# Patient Record
Sex: FEMALE | Race: WHITE | NOT HISPANIC OR LATINO | Employment: UNEMPLOYED | URBAN - METROPOLITAN AREA
[De-identification: names, ages, dates, MRNs, and addresses within clinical notes are randomized per-mention and may not be internally consistent; named-entity substitution may affect disease eponyms.]

---

## 2017-01-06 ENCOUNTER — HOSPITAL ENCOUNTER (EMERGENCY)
Facility: HOSPITAL | Age: 37
Discharge: HOME | End: 2017-01-06
Payer: MEDICAID

## 2017-01-06 VITALS
BODY MASS INDEX: 47.84 KG/M2 | HEART RATE: 99 BPM | RESPIRATION RATE: 20 BRPM | HEIGHT: 62 IN | SYSTOLIC BLOOD PRESSURE: 133 MMHG | DIASTOLIC BLOOD PRESSURE: 81 MMHG | TEMPERATURE: 98 F | WEIGHT: 260 LBS | OXYGEN SATURATION: 97 %

## 2017-01-06 DIAGNOSIS — R07.81 RIB PAIN ON LEFT SIDE: ICD-10-CM

## 2017-01-06 DIAGNOSIS — S20.212A CHEST WALL CONTUSION, LEFT, INITIAL ENCOUNTER: Primary | ICD-10-CM

## 2017-01-06 PROCEDURE — 99283 EMERGENCY DEPT VISIT LOW MDM: CPT

## 2017-01-06 RX ORDER — CYCLOBENZAPRINE HCL 10 MG
10 TABLET ORAL 3 TIMES DAILY PRN
Qty: 21 TABLET | Refills: 0 | Status: SHIPPED | OUTPATIENT
Start: 2017-01-06 | End: 2017-01-16

## 2017-01-06 NOTE — ED PROVIDER NOTES
SCRIBE #1 NOTE: I, Romana Terrell, am scribing for, and in the presence of, BALTAZAR Ascencio. I have scribed the entire note.      History      Chief Complaint   Patient presents with    Rib Injury     fell yesterday and left ribs hurts       Review of patient's allergies indicates:  No Known Allergies     HPI   HPI    1/6/2017, 12:34 PM   History obtained from the patient      History of Present Illness: Kymberly Talbot is a 36 y.o. female patient who presents to the Emergency Department for rib pain which onset suddenly last night. Pain is located to the left anterior ribs. Pt states that pain onset after falling and hitting her left ribs. Sx have been constant and moderate in severity. Sx exacerbated with deep inhalation and movement. No mitigating factors noted. Prior treatment includes Ibuprofen and Naprosyn with no relief. No associated sx included. Pt denies any fever, chills, CP, SOB, n/v, or LOC. No further complaints at this time.       Arrival mode: Personal vehicle      PCP: MAXWELL Lorenzo       Past Medical History:  Past Medical History   Diagnosis Date    Asthma     Cancer      uterine    Chronic anemia     Encounter for blood transfusion     Migraine     Migraine headache     Pulmonary embolism     Vaginal cuff cellulitis        Past Surgical History:  Past Surgical History   Procedure Laterality Date    Dilation and curettage of uterus      Pr removal of ovary/tube(s) Bilateral 2-20-15     robotic    Hysterectomy  2-20-15     robotic         Family History:  Family History   Problem Relation Age of Onset    Diabetes Mother     Stroke Mother     Hypertension Mother     Mental illness Mother     Hypertension Father     Heart attack Father     Asthma Sister     Lung cancer Maternal Aunt     Diabetes Maternal Aunt     Migraines Maternal Aunt     Breast cancer Neg Hx     Colon cancer Neg Hx     Ovarian cancer Neg Hx        Social History:  Social History     Social  History Main Topics    Smoking status: Never Smoker    Smokeless tobacco: Never Used    Alcohol use No    Drug use: No    Sexual activity: No       ROS   Review of Systems   Constitutional: Negative for chills, diaphoresis and fever.   HENT: Negative for sore throat.    Respiratory: Negative for shortness of breath.    Cardiovascular: Negative for chest pain.   Gastrointestinal: Negative for diarrhea, nausea and vomiting.   Genitourinary: Negative for dysuria.   Musculoskeletal: Negative for back pain.        (+) left rib pain   Skin: Negative for rash.   Neurological: Negative for dizziness, weakness, light-headedness, numbness and headaches.   Hematological: Does not bruise/bleed easily.       Physical Exam    Initial Vitals   BP Pulse Resp Temp SpO2   01/06/17 1229 01/06/17 1229 01/06/17 1229 01/06/17 1229 01/06/17 1229   133/81 99 20 97.9 °F (36.6 °C) 97 %      Physical Exam  Nursing Notes and Vital Signs Reviewed.  Constitutional: Patient is in no acute distress. Awake and alert. Well-developed and well-nourished.  Head: Atraumatic. Normocephalic.  Eyes: PERRL. EOM intact. Conjunctivae are not pale. No scleral icterus.  ENT: Mucous membranes are moist. Oropharynx is clear and symmetric.    Neck: Supple. Full ROM. No lymphadenopathy.  Cardiovascular: Regular rate. Regular rhythm. No murmurs, rubs, or gallops. Distal pulses are 2+ and symmetric.  Pulmonary/Chest: No respiratory distress. Clear to auscultation bilaterally. No wheezing, rales, or rhonchi.  Abdominal: Soft and non-distended.  There is no tenderness.  No rebound, guarding, or rigidity.   Musculoskeletal: Moves all extremities. No obvious deformities. Left anterior rib tenderness. No crepitus. No bruising.   Skin: Warm and dry.  Neurological:  Alert, awake, and appropriate.  Normal speech.  No acute focal neurological deficits are appreciated.  Psychiatric: Normal affect. Good eye contact. Appropriate in content.    ED Course    Procedures  ED  "Vital Signs:  Vitals:    01/06/17 1229   BP: 133/81   Pulse: 99   Resp: 20   Temp: 97.9 °F (36.6 °C)   TempSrc: Oral   SpO2: 97%   Weight: 117.9 kg (260 lb)   Height: 5' 2" (1.575 m)     Imaging Results:  Imaging Results         X-Ray Ribs 2 View Left (Final result) Result time:  01/06/17 13:01:43    Final result by WADE Barnes Sr., MD (01/06/17 13:01:43)    Impression:         Normal study.      Electronically signed by: WADE BARNES MD  Date:     01/06/17  Time:    13:01     Narrative:    Left rib series    Clinical History:     Pleurodynia    Findings: Comparison was made to a chest x-ray performed on 12/30/2016.  There is no rib fracture visualized.  The visualized portion of the lungs is clear.  There is no pneumothorax.  The left costophrenic angle is sharp.                      The Emergency Provider reviewed the vital signs and test results, which are outlined above.    ED Discussion     1:15 PM: Reassessed pt at this time. Discussed with pt all pertinent ED information and results. Discussed pt dx and plan of tx. Gave pt all f/u and return to the ED instructions. All questions and concerns were addressed at this time. Pt expresses understanding of information and instructions, and is comfortable with plan to discharge. Pt is stable for discharge.      New Prescriptions    CYCLOBENZAPRINE (FLEXERIL) 10 MG TABLET    Take 1 tablet (10 mg total) by mouth 3 (three) times daily as needed for Muscle spasms.       Follow-up Information     Follow up with MAXWELL Lorenzo In 1 week(s).    Specialty:  Family Medicine    Contact information:    [address]  [facility] [address] [phone]              Medical Decision Making    Medical Decision Making:   Clinical Tests:   Radiological Study: Ordered and Reviewed           Scribe Attestation:   Scribe #1: I performed the above scribed service and the documentation accurately describes the services I performed. I attest to the " accuracy of the note.    Attending:   Physician Attestation Statement for Scribe #1: I, BALTAZAR Ascencio, personally performed the services described in this documentation, as scribed by Romana Terrell, in my presence, and it is both accurate and complete.          Clinical Impression       ICD-10-CM ICD-9-CM   1. Chest wall contusion, left, initial encounter S20.212A 922.1   2. Rib pain on left side R07.81 786.50       Disposition:   Disposition: Discharged  Condition: Stable         BALTAZAR Moralez  01/07/17 0803

## 2017-01-06 NOTE — DISCHARGE INSTRUCTIONS
Chest Contusion    A contusion is a bruise to the skin, muscle, or ribs. It may cause pain, tenderness, and swelling. It may turn the skin purple until it heals. Contusions take a few days to a few weeks to heal.  Home care  Follow these guidelines when caring for yourself at home:  · Rest. Dont do any heavy lifting or strenuous activity. Dont do any activity that causes pain.  · Put an ice pack on the injured area. Do this for 20 minutes every 1 to 2 hours the first day. You can make an ice pack by wrapping a plastic bag of ice cubes in a thin towel. Continue to use the ice pack 3 to 4 times a day for the next 2 days. Then use the ice pack as needed to ease pain and swelling.  · After 1 to 2 days you may put a warm compress on the area. Do this for 10 minutes several times a day. A warm compress is a clean cloth thats damp with warm water.  · Hold a pillow to the affected area when you cough. This will help ease pain.  · You may use acetaminophen or ibuprofen to control pain, unless another pain medicine was prescribed. If you have chronic liver or kidney disease, talk with your health care provider before using these medicines. Also talk with your provider if youve had a stomach ulcer or GI bleeding.  Follow-up care  Follow up with your health care provider during the next week, or as advised.  When to seek medical advice  Call your health care provider right away if any of these occur:  · Shortness of breath, difficulty breathing, or breathing fast  · Chest pain gets worse when you breathe  · Severe pain that comes on suddenly or lasts more than an hour  · Dizziness, weakness, or fainting  · New abdominal pain or abdominal pain that gets worse  ·  Fever of 101ºF (38.3ºC) or higher, or as directed by your health care provider  © 0111-0639 [employer]. [address] [place] [address], [place] [address]. All rights reserved. This information is not intended as a substitute for professional medical care.  Always follow your healthcare professional's instructions.

## 2019-07-17 ENCOUNTER — HOSPITAL ENCOUNTER (EMERGENCY)
Dept: HOSPITAL 96 - M.ERS | Age: 39
Discharge: HOME | End: 2019-07-17
Payer: COMMERCIAL

## 2019-07-17 VITALS — DIASTOLIC BLOOD PRESSURE: 71 MMHG | SYSTOLIC BLOOD PRESSURE: 109 MMHG

## 2019-07-17 VITALS — BODY MASS INDEX: 47.85 KG/M2 | HEIGHT: 62 IN | WEIGHT: 260.01 LBS

## 2019-07-17 DIAGNOSIS — M25.561: Primary | ICD-10-CM

## 2019-07-17 DIAGNOSIS — Z90.49: ICD-10-CM

## 2019-07-17 DIAGNOSIS — Z90.710: ICD-10-CM

## 2019-07-17 DIAGNOSIS — Z88.6: ICD-10-CM

## 2019-07-29 ENCOUNTER — HOSPITAL ENCOUNTER (EMERGENCY)
Dept: HOSPITAL 96 - M.ERS | Age: 39
Discharge: HOME | End: 2019-07-29
Payer: COMMERCIAL

## 2019-07-29 VITALS — BODY MASS INDEX: 47.85 KG/M2 | WEIGHT: 260.01 LBS | HEIGHT: 62 IN

## 2019-07-29 VITALS — SYSTOLIC BLOOD PRESSURE: 145 MMHG | DIASTOLIC BLOOD PRESSURE: 91 MMHG

## 2019-07-29 DIAGNOSIS — Z90.710: ICD-10-CM

## 2019-07-29 DIAGNOSIS — Z88.8: ICD-10-CM

## 2019-07-29 DIAGNOSIS — J02.9: ICD-10-CM

## 2019-07-29 DIAGNOSIS — J18.1: Primary | ICD-10-CM

## 2019-08-18 ENCOUNTER — HOSPITAL ENCOUNTER (EMERGENCY)
Dept: HOSPITAL 96 - M.ERS | Age: 39
LOS: 1 days | Discharge: HOME | End: 2019-08-19
Payer: COMMERCIAL

## 2019-08-18 VITALS — BODY MASS INDEX: 47.85 KG/M2 | WEIGHT: 260.01 LBS | HEIGHT: 62 IN

## 2019-08-18 DIAGNOSIS — Z90.49: ICD-10-CM

## 2019-08-18 DIAGNOSIS — R07.89: Primary | ICD-10-CM

## 2019-08-18 DIAGNOSIS — Z90.710: ICD-10-CM

## 2019-08-18 DIAGNOSIS — Z88.6: ICD-10-CM

## 2019-08-19 VITALS — SYSTOLIC BLOOD PRESSURE: 145 MMHG | DIASTOLIC BLOOD PRESSURE: 82 MMHG

## 2019-08-19 LAB
ABSOLUTE BASOPHILS: 0 THOU/UL (ref 0–0.2)
ABSOLUTE EOSINOPHILS: 0.2 THOU/UL (ref 0–0.7)
ABSOLUTE MONOCYTES: 0.5 THOU/UL (ref 0–1.2)
ALBUMIN SERPL-MCNC: 3.5 G/DL (ref 3.4–5)
ALP SERPL-CCNC: 114 U/L (ref 46–116)
ALT SERPL-CCNC: 49 U/L (ref 30–65)
ANION GAP SERPL CALC-SCNC: 10 MMOL/L (ref 7–16)
APTT BLD: 26.8 SECONDS (ref 25–31.3)
AST SERPL-CCNC: 25 U/L (ref 15–37)
BASOPHILS NFR BLD AUTO: 0.5 %
BILIRUB SERPL-MCNC: 0.3 MG/DL
BUN SERPL-MCNC: 13 MG/DL (ref 7–18)
CALCIUM SERPL-MCNC: 8.4 MG/DL (ref 8.5–10.1)
CHLORIDE SERPL-SCNC: 105 MMOL/L (ref 98–107)
CK-MB MASS: 1.5 NG/ML
CO2 SERPL-SCNC: 25 MMOL/L (ref 21–32)
CREAT SERPL-MCNC: 0.8 MG/DL (ref 0.6–1.3)
EOSINOPHIL NFR BLD: 2.9 %
GLUCOSE SERPL-MCNC: 106 MG/DL (ref 70–99)
GRANULOCYTES NFR BLD MANUAL: 59.5 %
HCT VFR BLD CALC: 37.3 % (ref 37–47)
HGB BLD-MCNC: 12.5 GM/DL (ref 12–15)
INR PPP: 1
LIPASE: 122 U/L (ref 73–393)
LYMPHOCYTES # BLD: 2.2 THOU/UL (ref 0.8–5.3)
LYMPHOCYTES NFR BLD AUTO: 29.9 %
MAGNESIUM SERPL-MCNC: 1.7 MG/DL (ref 1.8–2.4)
MCH RBC QN AUTO: 28 PG (ref 26–34)
MCHC RBC AUTO-ENTMCNC: 33.5 G/DL (ref 28–37)
MCV RBC: 83.5 FL (ref 80–100)
MONOCYTES NFR BLD: 7.2 %
MPV: 8.1 FL. (ref 7.2–11.1)
NEUTROPHILS # BLD: 4.4 THOU/UL (ref 1.6–8.1)
NT-PRO BRAIN NAT PEPTIDE: 65 PG/ML (ref ?–300)
NUCLEATED RBCS: 0 /100WBC
PLATELET COUNT*: 248 THOU/UL (ref 150–400)
POTASSIUM SERPL-SCNC: 3.6 MMOL/L (ref 3.5–5.1)
PROT SERPL-MCNC: 7.2 G/DL (ref 6.4–8.2)
PROTHROMBIN TIME: 10 SECONDS (ref 9.2–11.5)
RBC # BLD AUTO: 4.46 MIL/UL (ref 4.2–5)
RDW-CV: 13.5 % (ref 10.5–14.5)
SODIUM SERPL-SCNC: 140 MMOL/L (ref 136–145)
TROPONIN-I LEVEL: <0.06 NG/ML (ref ?–0.06)
WBC # BLD AUTO: 7.4 THOU/UL (ref 4–11)

## 2019-08-19 NOTE — EKG
[place] [address]
Phone:  [phone]                     ELECTROCARDIOGRAM REPORT      
_______________________________________________________________________________
 
Name:       MYRABEE TRIP             [id number]      Account #:      [id number]  
Admission:  19     Attend Phys:                         
Discharge:  19     Date of Birth:  [birth year]  
         Report #: [id number]
    [id number]
_______________________________________________________________________________
THIS REPORT FOR:  //name//                      
 
                         [facility] ED
                                       
Test Date:    2019               Test Time:    00:04:21
Pat Name:     BEE RENTERIA            Department:   
Patient ID:   [id number]            Room:          
Gender:       F                        Technician:   FL
:          1980               Requested By: Paco Welch
Order Number: [id number] MD:   Dameon Garcia
                                 Measurements
Intervals                              Axis          
Rate:         82                       P:            59
ME:           160                      QRS:          19
QRSD:         91                       T:            14
QT:           377                                    
QTc:          441                                    
                           Interpretive Statements
Sinus rhythm
No previous ECG available for comparison
 
Electronically Signed On 2019 17:11:45 CDT by Dameon Garcia
[email or web address]
 
 
 
 
 
 
 
 
 
 
 
 
 
 
 
 
 
 
 
 
  <ELECTRONICALLY SIGNED>
                                           By: Dameon Garcia MD, [facility]      
  19     1711
D: 19 0004   _____________________________________
T: 19 0004   Dameon Garcia MD, FACC        /EPI

## 2021-07-31 ENCOUNTER — HISTORICAL (OUTPATIENT)
Dept: ADMINISTRATIVE | Facility: HOSPITAL | Age: 41
End: 2021-07-31

## 2023-04-01 ENCOUNTER — HOSPITAL ENCOUNTER (EMERGENCY)
Facility: HOSPITAL | Age: 43
Discharge: LEFT BEFORE DISCHARGE | End: 2023-04-01
Payer: COMMERCIAL

## 2023-04-01 PROCEDURE — 99281 EMR DPT VST MAYX REQ PHY/QHP: CPT

## 2023-05-01 ENCOUNTER — HOSPITAL ENCOUNTER (EMERGENCY)
Facility: HOSPITAL | Age: 43
Discharge: HOME | End: 2023-05-01
Attending: INTERNAL MEDICINE
Payer: MEDICAID

## 2023-05-01 VITALS
OXYGEN SATURATION: 100 % | BODY MASS INDEX: 45.76 KG/M2 | DIASTOLIC BLOOD PRESSURE: 101 MMHG | SYSTOLIC BLOOD PRESSURE: 144 MMHG | HEART RATE: 100 BPM | TEMPERATURE: 98 F | RESPIRATION RATE: 18 BRPM | WEIGHT: 250.19 LBS

## 2023-05-01 DIAGNOSIS — R07.9 CHEST PAIN, UNSPECIFIED TYPE: Primary | ICD-10-CM

## 2023-05-01 PROBLEM — G43.909 MIGRAINE HEADACHE: Status: ACTIVE | Noted: 2023-05-01

## 2023-05-01 LAB
ALBUMIN SERPL-MCNC: 3.5 G/DL (ref 3.5–5)
ALBUMIN/GLOB SERPL: 1.1 RATIO (ref 1.1–2)
ALP SERPL-CCNC: 100 UNIT/L (ref 40–150)
ALT SERPL-CCNC: 29 UNIT/L (ref 0–55)
AMPHET UR QL SCN: POSITIVE
APPEARANCE UR: CLEAR
AST SERPL-CCNC: 18 UNIT/L (ref 5–34)
BARBITURATE SCN PRESENT UR: NEGATIVE
BASOPHILS # BLD AUTO: 0.03 X10(3)/MCL (ref 0–0.2)
BASOPHILS NFR BLD AUTO: 0.5 %
BENZODIAZ UR QL SCN: NEGATIVE
BILIRUB UR QL STRIP.AUTO: NEGATIVE MG/DL
BILIRUBIN DIRECT+TOT PNL SERPL-MCNC: 0.4 MG/DL
BNP BLD-MCNC: <10 PG/ML
BUN SERPL-MCNC: 9 MG/DL (ref 7–18.7)
CALCIUM SERPL-MCNC: 9.1 MG/DL (ref 8.4–10.2)
CANNABINOIDS UR QL SCN: NEGATIVE
CHLORIDE SERPL-SCNC: 106 MMOL/L (ref 98–107)
CO2 SERPL-SCNC: 25 MMOL/L (ref 22–29)
COCAINE UR QL SCN: NEGATIVE
COLOR UR AUTO: YELLOW
CREAT SERPL-MCNC: 0.78 MG/DL (ref 0.55–1.02)
D DIMER PPP IA.FEU-MCNC: 0.31 UG/ML FEU (ref 0–0.5)
EOSINOPHIL # BLD AUTO: 0.16 X10(3)/MCL (ref 0–0.9)
EOSINOPHIL NFR BLD AUTO: 2.5 %
ERYTHROCYTE [DISTWIDTH] IN BLOOD BY AUTOMATED COUNT: 13.1 % (ref 11.5–17)
EST. AVERAGE GLUCOSE BLD GHB EST-MCNC: 128.4 MG/DL
FENTANYL UR QL SCN: NEGATIVE
GFR SERPLBLD CREATININE-BSD FMLA CKD-EPI: >60 MLS/MIN/1.73/M2
GLOBULIN SER-MCNC: 3.3 GM/DL (ref 2.4–3.5)
GLUCOSE SERPL-MCNC: 219 MG/DL (ref 74–100)
GLUCOSE UR QL STRIP.AUTO: ABNORMAL MG/DL
HBA1C MFR BLD: 6.1 %
HCT VFR BLD AUTO: 38.1 % (ref 37–47)
HGB BLD-MCNC: 12.1 G/DL (ref 12–16)
IMM GRANULOCYTES # BLD AUTO: 0.04 X10(3)/MCL (ref 0–0.04)
IMM GRANULOCYTES NFR BLD AUTO: 0.6 %
KETONES UR QL STRIP.AUTO: NEGATIVE MG/DL
LEUKOCYTE ESTERASE UR QL STRIP.AUTO: NEGATIVE UNIT/L
LYMPHOCYTES # BLD AUTO: 2.2 X10(3)/MCL (ref 0.6–4.6)
LYMPHOCYTES NFR BLD AUTO: 34.6 %
MCH RBC QN AUTO: 26.9 PG (ref 27–31)
MCHC RBC AUTO-ENTMCNC: 31.8 G/DL (ref 33–36)
MCV RBC AUTO: 84.7 FL (ref 80–94)
MDMA UR QL SCN: NEGATIVE
MONOCYTES # BLD AUTO: 0.46 X10(3)/MCL (ref 0.1–1.3)
MONOCYTES NFR BLD AUTO: 7.2 %
NEUTROPHILS # BLD AUTO: 3.47 X10(3)/MCL (ref 2.1–9.2)
NEUTROPHILS NFR BLD AUTO: 54.6 %
NITRITE UR QL STRIP.AUTO: NEGATIVE
OPIATES UR QL SCN: NEGATIVE
PCP UR QL: NEGATIVE
PH UR STRIP.AUTO: 5 [PH]
PH UR: 5 [PH] (ref 3–11)
PLATELET # BLD AUTO: 234 X10(3)/MCL (ref 130–400)
PMV BLD AUTO: 9.5 FL (ref 7.4–10.4)
POTASSIUM SERPL-SCNC: 3.5 MMOL/L (ref 3.5–5.1)
PROT SERPL-MCNC: 6.8 GM/DL (ref 6.4–8.3)
PROT UR QL STRIP.AUTO: NEGATIVE MG/DL
RBC # BLD AUTO: 4.5 X10(6)/MCL (ref 4.2–5.4)
RBC UR QL AUTO: NEGATIVE UNIT/L
SODIUM SERPL-SCNC: 139 MMOL/L (ref 136–145)
SP GR UR STRIP.AUTO: >=1.03
TROPONIN I SERPL-MCNC: <0.01 NG/ML (ref 0–0.04)
UROBILINOGEN UR STRIP-ACNC: 0.2 MG/DL
WBC # SPEC AUTO: 6.4 X10(3)/MCL (ref 4.5–11.5)

## 2023-05-01 PROCEDURE — 83036 HEMOGLOBIN GLYCOSYLATED A1C: CPT | Performed by: INTERNAL MEDICINE

## 2023-05-01 PROCEDURE — 81003 URINALYSIS AUTO W/O SCOPE: CPT | Performed by: INTERNAL MEDICINE

## 2023-05-01 PROCEDURE — 93010 ELECTROCARDIOGRAM REPORT: CPT | Mod: ,,, | Performed by: INTERNAL MEDICINE

## 2023-05-01 PROCEDURE — 84484 ASSAY OF TROPONIN QUANT: CPT | Performed by: INTERNAL MEDICINE

## 2023-05-01 PROCEDURE — 93005 ELECTROCARDIOGRAM TRACING: CPT

## 2023-05-01 PROCEDURE — 99285 EMERGENCY DEPT VISIT HI MDM: CPT | Mod: 25

## 2023-05-01 PROCEDURE — 85379 FIBRIN DEGRADATION QUANT: CPT | Performed by: INTERNAL MEDICINE

## 2023-05-01 PROCEDURE — 80307 DRUG TEST PRSMV CHEM ANLYZR: CPT | Performed by: INTERNAL MEDICINE

## 2023-05-01 PROCEDURE — 80053 COMPREHEN METABOLIC PANEL: CPT | Performed by: INTERNAL MEDICINE

## 2023-05-01 PROCEDURE — 93010 EKG 12-LEAD: ICD-10-PCS | Mod: ,,, | Performed by: INTERNAL MEDICINE

## 2023-05-01 PROCEDURE — 85025 COMPLETE CBC W/AUTO DIFF WBC: CPT | Performed by: INTERNAL MEDICINE

## 2023-05-01 PROCEDURE — 83880 ASSAY OF NATRIURETIC PEPTIDE: CPT | Performed by: INTERNAL MEDICINE

## 2023-05-01 NOTE — ED PROVIDER NOTES
05/01/2023         4:27 AM    Source of History:  History obtained from the patient.     Chief complaint:  From Nurse Triage:  Chest Pain (States began 4 hours ago with sharp mid sternal chest pain. )    HISTORY OF PRESENT ILLNES:  Kymberly Talbot is a 43 y.o. female  has a past medical history of Asthma, Cancer, Chronic anemia, Encounter for blood transfusion, Migraine, Migraine headache, Pulmonary embolism, and Vaginal cuff cellulitis. presenting with Chest Pain (States began 4 hours ago with sharp mid sternal chest pain. )      REVIEW OF SYSTEMS:   Constitutional symptoms:  No Fever. No Chills    Skin symptoms:  No Rash.    Eye symptoms:  No Visual disturbance reported.   ENMT symptoms:  No Sore throat,    Respiratory symptoms:  No Shortness of Breath, no Cough, no Wheezing.    Cardiovascular symptoms:   Chest Pain, No Palpitations.   Gastrointestinal symptoms:  No Abdominal Pain, No Nausea, No Vomiting, No Diarrhea, No Constipation.    Genitourinary symptoms:  No Dysuria,    Musculoskeletal symptoms:  No Back pain,    Neurologic symptoms:  No Headache, No Dizziness.    Psychiatric symptoms:  No Anxiety, No Depression, No Substance Abuse.              Additional review of systems information: Patient Denies Any Other Complaints.    All Other Systems Reviewed With Patient And Negative.    ALLEGIES:  Review of patient's allergies indicates:   Allergen Reactions    Ketorolac Hives       MEDICINE LIST:  No current outpatient medications     PMH:  As per HPI and below:    Reviewed and updated in chart.    PAST MEDICAL HISTORY:  Past Medical History:   Diagnosis Date    Asthma     Cancer     uterine    Chronic anemia     Encounter for blood transfusion     Migraine     Migraine headache     Pulmonary embolism     Vaginal cuff cellulitis         PAST SURGICAL HISTORY:  Past Surgical History:   Procedure Laterality Date    CHOLECYSTECTOMY      DILATION AND CURETTAGE OF UTERUS      HYSTERECTOMY  02/20/2015    robotic     MI REMOVAL OF OVARY/TUBE(S) Bilateral 02/20/2015    robotic       SOCIAL HISTORY:  Social History     Tobacco Use    Smoking status: Never    Smokeless tobacco: Never   Substance Use Topics    Alcohol use: No    Drug use: Yes     Types: Methamphetamines     Comment: last used 1 week ago       FAMILY HISTORY:  Family History   Problem Relation Age of Onset    Diabetes Mother     Stroke Mother     Hypertension Mother     Mental illness Mother     Hypertension Father     Heart attack Father     Asthma Sister     Lung cancer Maternal Aunt     Diabetes Maternal Aunt     Migraines Maternal Aunt     Breast cancer Neg Hx     Colon cancer Neg Hx     Ovarian cancer Neg Hx         PROBLEM LIST:  Patient Active Problem List   Diagnosis    Endometrial cancer    Asthma    BMI 40.0-44.9, adult    Post-operative pain    Sleep disorder    Pulmonary embolism without acute cor pulmonale    Uterine cancer    Morbid obesity with BMI of 45.0-49.9, adult    Migraine headache    Severe acute respiratory syndrome coronavirus 2 (SARS-CoV-2) vaccination not indicated        PHYSICAL EXAM:      ED Triage Vitals [05/01/23 0415]   BP (!) 170/112   Pulse 108   Resp 18   Temp 98.1 °F (36.7 °C)   SpO2 100 %        Vital Signs: Reviewed As In Chart.  General:  Alert, No Cardiorespiratory Distress Noted.   Eye:  Extraocular Movements Are Intact.   ENT: Mucus membranes are moist.   Cardiovascular:  Regular Rate And Rhythm, No Murmur, No Pedal Edema.    Respiratory:  Respirations Nonlabored, No Respiratory Distress, Good Bilateral Air Entry, No Rales, No Rhonchi.    Gastrointestinal:  Soft, Non Distended, Non Tenderness, Normal Bowel Sounds.    Neurological:  Alert And Oriented To Person, Place, Time, And Situation, Normal Motor Observed, Normal Speech Observed.  Musculoskeletal:  No Gross Deformity Noted.     Psychiatric:  Cooperative.      ED WORKUP FOR MEDICAL DECISION MAKING:    ED ORDERS:  Orders Placed This Encounter   Procedures    X-ray  Chest AP Portable    Comprehensive metabolic panel    CBC auto differential    Troponin I    Brain natriuretic peptide    CBC with Differential    D dimer, quantitative    Drug Screen, Urine    Urinalysis, Reflex to Urine Culture    Hemoglobin A1C    Diet NPO    Vital signs    Cardiac Monitoring - Adult    Oxygen Continuous    Pulse Oximetry Continuous    EKG 12-LEAD    Insert saline lock       ED MEDICINES:  Medications - No data to display         ECG Results              EKG 12-LEAD (Preliminary result)  Result time 05/01/23 04:29:02      Wet Read by Radha Morel MD (05/01/23 04:29:02, [facility] - Emergency Dept, Emergency Medicine)    EKG: Independently reviewed and / or Interpreted by Radha Morel MD. independently as Normal Sinus Rhythm, Rate 109, No STEMI, sinus tachycardia, Right Axis Deviation                                     ED LABS ORDERED AND REVIEWED:  Admission on 05/01/2023   Component Date Value Ref Range Status    Sodium Level 05/01/2023 139  136 - 145 mmol/L Final    Potassium Level 05/01/2023 3.5  3.5 - 5.1 mmol/L Final    Chloride 05/01/2023 106  98 - 107 mmol/L Final    Carbon Dioxide 05/01/2023 25  22 - 29 mmol/L Final    Glucose Level 05/01/2023 219 (H)  74 - 100 mg/dL Final    Blood Urea Nitrogen 05/01/2023 9.0  7.0 - 18.7 mg/dL Final    Creatinine 05/01/2023 0.78  0.55 - 1.02 mg/dL Final    Calcium Level Total 05/01/2023 9.1  8.4 - 10.2 mg/dL Final    Protein Total 05/01/2023 6.8  6.4 - 8.3 gm/dL Final    Albumin Level 05/01/2023 3.5  3.5 - 5.0 g/dL Final    Globulin 05/01/2023 3.3  2.4 - 3.5 gm/dL Final    Albumin/Globulin Ratio 05/01/2023 1.1  1.1 - 2.0 ratio Final    Bilirubin Total 05/01/2023 0.4  <=1.5 mg/dL Final    Alkaline Phosphatase 05/01/2023 100  40 - 150 unit/L Final    Alanine Aminotransferase 05/01/2023 29  0 - 55 unit/L Final    Aspartate Aminotransferase 05/01/2023 18  5 - 34 unit/L Final    eGFR 05/01/2023 >60  mls/min/1.73/m2 Final    Troponin-I  05/01/2023 <0.010  0.000 - 0.045 ng/mL Final    Natriuretic Peptide 05/01/2023 <10.0  <=100.0 pg/mL Final    WBC 05/01/2023 6.4  4.5 - 11.5 x10(3)/mcL Final    RBC 05/01/2023 4.50  4.20 - 5.40 x10(6)/mcL Final    Hgb 05/01/2023 12.1  12.0 - 16.0 g/dL Final    Hct 05/01/2023 38.1  37.0 - 47.0 % Final    MCV 05/01/2023 84.7  80.0 - 94.0 fL Final    MCH 05/01/2023 26.9 (L)  27.0 - 31.0 pg Final    MCHC 05/01/2023 31.8 (L)  33.0 - 36.0 g/dL Final    RDW 05/01/2023 13.1  11.5 - 17.0 % Final    Platelet 05/01/2023 234  130 - 400 x10(3)/mcL Final    MPV 05/01/2023 9.5  7.4 - 10.4 fL Final    Neut % 05/01/2023 54.6  % Final    Lymph % 05/01/2023 34.6  % Final    Mono % 05/01/2023 7.2  % Final    Eos % 05/01/2023 2.5  % Final    Basophil % 05/01/2023 0.5  % Final    Lymph # 05/01/2023 2.20  0.6 - 4.6 x10(3)/mcL Final    Neut # 05/01/2023 3.47  2.1 - 9.2 x10(3)/mcL Final    Mono # 05/01/2023 0.46  0.1 - 1.3 x10(3)/mcL Final    Eos # 05/01/2023 0.16  0 - 0.9 x10(3)/mcL Final    Baso # 05/01/2023 0.03  0 - 0.2 x10(3)/mcL Final    IG# 05/01/2023 0.04  0 - 0.04 x10(3)/mcL Final    IG% 05/01/2023 0.6  % Final    D-Dimer 05/01/2023 0.31  0.00 - 0.50 ug/mL FEU Final    Amphetamines, Urine 05/01/2023 Positive (A)  Negative Final    Barbituates, Urine 05/01/2023 Negative  Negative Final    Benzodiazepine, Urine 05/01/2023 Negative  Negative Final    Cannabinoids, Urine 05/01/2023 Negative  Negative Final    Cocaine, Urine 05/01/2023 Negative  Negative Final    Fentanyl, Urine 05/01/2023 Negative  Negative Final    MDMA, Urine 05/01/2023 Negative  Negative Final    Opiates, Urine 05/01/2023 Negative  Negative Final    Phencyclidine, Urine 05/01/2023 Negative  Negative Final    pH, Urine 05/01/2023 5.0  3.0 - 11.0 Final    Color, UA 05/01/2023 Yellow  Yellow, Light-Yellow, Dark Yellow, Bhavya, Straw Final    Appearance, UA 05/01/2023 Clear  Clear Final    Specific Gravity, UA 05/01/2023 >=1.030   Final    pH, UA 05/01/2023 5.0  5.0 - 8.5  Final    Protein, UA 05/01/2023 Negative  Negative mg/dL Final    Glucose, UA 05/01/2023 Trace (A)  Negative, Normal mg/dL Final    Ketones, UA 05/01/2023 Negative  Negative mg/dL Final    Blood, UA 05/01/2023 Negative  Negative unit/L Final    Bilirubin, UA 05/01/2023 Negative  Negative mg/dL Final    Urobilinogen, UA 05/01/2023 0.2  0.2, 1.0, Normal mg/dL Final    Nitrites, UA 05/01/2023 Negative  Negative Final    Leukocyte Esterase, UA 05/01/2023 Negative  Negative unit/L Final       RADIOLOGY STUDIES ORDERED AND REVIEWED:  Imaging Results              X-ray Chest AP Portable (Preliminary result)  Result time 05/01/23 05:41:45      Wet Read by Radha Morel MD (05/01/23 05:41:45, [facility] - Emergency Dept, Emergency Medicine)    Chest One View:  Independently reviewed and/or interpreted by Radha Morel MD.  No Focal Consolidation, No Acute Cardiopulmonary abnormality identified grossly.                                    MEDICAL DECISION MAKING:      Reviewed Nurses Note. Reviewed Vital Signs.     Reviewed Pertinent old records, History and updated as necessary.    Vitals:    05/01/23 0530   BP: (!) 144/101   Pulse: 100   Resp: 18   Temp:         Medical Decision Making   43 y.o. female  has a past medical history of Asthma, Cancer, Chronic anemia, Encounter for blood transfusion, Migraine, Migraine headache, Pulmonary embolism, and Vaginal cuff cellulitis. presenting with Chest Pain (States began 4 hours ago with sharp mid sternal chest pain. )    Patient has history of pulmonary embolism, she is not taking any medicine for that anymore, she says she was moving the stuff as she is moving her home and developed chest pain, then she got scared because his mother and father both have heart problems so she decided to come to the emergency room to get checked.  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   She is not taking any medicine these days.        Amount and/or Complexity of Data Reviewed  Labs: ordered. Decision-making details documented in ED Course.  Radiology: ordered and independent interpretation performed. Decision-making details documented in ED Course.  ECG/medicine tests: ordered and independent interpretation performed. Decision-making details documented in ED Course.              ED Course as of 05/01/23 9089   Mon May 01, 2023   0545 Patient's blood pressure was elevated on arrival, it has come down, also her blood sugars 219 but she ate 2-3 hours before coming to the emergency room, I have advised her that she must see her family doctor to monitor her blood pressure and to start her on medicine as needed, and also to get her checked for diabetes mellitus.  I will send hemoglobin A1c and she can follow up with the family doctor. [GQ]   7746 Also advised her that she should talk to a cardiologist to do a stress test and further workup for heart if needed.  She was concerned that her mom and dad had heart problem so she wanted to get checked. [GQ]      ED Course User Index  [GQ] Radha Morel MD            PROCEDURES PERFORMED IN ED:  Procedures    DIAGNOSTIC IMPRESSION:        ICD-10-CM ICD-9-CM   1. Chest pain, unspecified type  R07.9 786.50         ED Disposition Condition    Discharge Stable               Medication List      You have not been prescribed any medications.           Follow-up Information       PMD In 2 days.               Cardiology.                              ED Prescriptions    None       Follow-up Information       Follow up With Specialties Details Why Contact Info    PMD  In 2 days      Cardiology                   Radha Morel MD  05/01/23 9954

## 2023-06-02 ENCOUNTER — HOSPITAL ENCOUNTER (EMERGENCY)
Facility: HOSPITAL | Age: 43
Discharge: HOME | End: 2023-06-02
Attending: EMERGENCY MEDICINE
Payer: COMMERCIAL

## 2023-06-02 VITALS
DIASTOLIC BLOOD PRESSURE: 97 MMHG | BODY MASS INDEX: 46.46 KG/M2 | TEMPERATURE: 98 F | RESPIRATION RATE: 16 BRPM | SYSTOLIC BLOOD PRESSURE: 131 MMHG | OXYGEN SATURATION: 100 % | HEIGHT: 61 IN | WEIGHT: 246.06 LBS | HEART RATE: 91 BPM

## 2023-06-02 DIAGNOSIS — R52 PAIN: ICD-10-CM

## 2023-06-02 DIAGNOSIS — M79.672 FOOT PAIN, LEFT: Primary | ICD-10-CM

## 2023-06-02 PROCEDURE — 99284 EMERGENCY DEPT VISIT MOD MDM: CPT

## 2023-06-02 RX ORDER — PREDNISONE 20 MG/1
60 TABLET ORAL DAILY
Qty: 15 TABLET | Refills: 0 | Status: SHIPPED | OUTPATIENT
Start: 2023-06-02 | End: 2023-06-07

## 2023-06-02 RX ORDER — TRAMADOL HYDROCHLORIDE 50 MG/1
50 TABLET ORAL EVERY 6 HOURS PRN
Qty: 20 TABLET | Refills: 0 | Status: SHIPPED | OUTPATIENT
Start: 2023-06-02 | End: 2023-06-07

## 2023-06-02 NOTE — ED PROVIDER NOTES
Encounter Date: 6/2/2023       History     Chief Complaint   Patient presents with    Foot Pain     Pt states that she began having right   foot pain 2 days ago. Denies any injury or trauma to the area. States that it hurts to walk on it and the pain is mostly at the bottom of the foot.     The history is provided by the patient. No  was used.   Foot Pain  This is a new problem. The current episode started 2 days ago. The problem occurs constantly. The problem has not changed since onset.Pertinent negatives include no chest pain and no shortness of breath. The symptoms are aggravated by walking and standing. Nothing relieves the symptoms.   Denies specific injury.    Review of patient's allergies indicates:   Allergen Reactions    Ketorolac Hives     Past Medical History:   Diagnosis Date    Asthma     Cancer     uterine    Chronic anemia     Encounter for blood transfusion     Migraine     Migraine headache     Pulmonary embolism     Vaginal cuff cellulitis      Past Surgical History:   Procedure Laterality Date    CHOLECYSTECTOMY      DILATION AND CURETTAGE OF UTERUS      HYSTERECTOMY  02/20/2015    robotic    [place] REMOVAL OF OVARY/TUBE(S) Bilateral 02/20/2015    robotic     Family History   Problem Relation Age of Onset    Diabetes Mother     Stroke Mother     Hypertension Mother     Mental illness Mother     Hypertension Father     Heart attack Father     Asthma Sister     Lung cancer Maternal Aunt     Diabetes Maternal Aunt     Migraines Maternal Aunt     Breast cancer Neg Hx     Colon cancer Neg Hx     Ovarian cancer Neg Hx      Social History     Tobacco Use    Smoking status: Never    Smokeless tobacco: Never   Substance Use Topics    Alcohol use: No    Drug use: Yes     Types: Methamphetamines     Comment: last used 1 week ago     Review of Systems   Constitutional:  Negative for fever.   HENT:  Negative for sore throat.    Respiratory:  Negative for shortness of breath.    Cardiovascular:   Negative for chest pain.   Gastrointestinal:  Negative for nausea.   Genitourinary:  Negative for dysuria.   Musculoskeletal:  Negative for back pain.   Skin:  Negative for rash.   Neurological:  Negative for weakness.   Hematological:  Does not bruise/bleed easily.     Physical Exam     Initial Vitals [06/02/23 0637]   BP Pulse Resp Temp SpO2   (!) 143/91 107 18 97.8 °F (36.6 °C) 100 %      MAP       --         Physical Exam    Nursing note and vitals reviewed.  Constitutional: She appears well-developed and well-nourished.   HENT:   Head: Normocephalic and atraumatic.   Right Ear: External ear normal.   Left Ear: External ear normal.   Eyes: Conjunctivae and EOM are normal. Pupils are equal, round, and reactive to light.   Neck: Neck supple.   Normal range of motion.  Cardiovascular:  Normal rate, regular rhythm, normal heart sounds and intact distal pulses.           Pulmonary/Chest: Breath sounds normal.   Abdominal: Abdomen is soft. Bowel sounds are normal.   obese   Musculoskeletal:         General: Normal range of motion.      Cervical back: Normal range of motion and neck supple.        Feet:      Neurological: She is alert and oriented to person, place, and time. GCS score is 15. GCS eye subscore is 4. GCS verbal subscore is 5. GCS motor subscore is 6.   Skin: Skin is warm and dry. Capillary refill takes less than 2 seconds.   Psychiatric: She has a normal mood and affect. Her behavior is normal. Judgment and thought content normal.       ED Course   Procedures  Labs Reviewed - No data to display       Imaging Results              X-Ray Foot Complete Right (Preliminary result)  Result time 06/02/23 07:16:01   Procedure changed from X-Ray Foot Complete Left     Wet Read by Adiel Radford MD (06/02/23 07:16:01, [facility], Emergency Medicine)    negative                                     Medications - No data to display                           Clinical Impression:    Final diagnoses:  [R52] Pain  [M79.672] Foot pain, left (Primary)        ED Disposition Condition    Discharge Stable          ED Prescriptions       Medication Sig Dispense Start Date End Date Auth. Provider    predniSONE (DELTASONE) 20 MG tablet Take 3 tablets (60 mg total) by mouth once daily. for 5 days 15 tablet 6/2/2023 6/7/2023 Adiel Radford MD    traMADoL (ULTRAM) 50 mg tablet Take 1 tablet (50 mg total) by mouth every 6 (six) hours as needed for Pain. 20 tablet 6/2/2023 6/7/2023 Adiel Radford MD          Follow-up Information       Follow up With Specialties Details Why Contact Info    Follow up with your primary MD in 3-5 days if not improved.  Return to ED for worsening symptoms.                 Adiel Radford MD  06/02/23 5332

## 2023-06-26 ENCOUNTER — HOSPITAL ENCOUNTER (EMERGENCY)
Facility: HOSPITAL | Age: 43
Discharge: HOME | End: 2023-06-26
Attending: INTERNAL MEDICINE
Payer: COMMERCIAL

## 2023-06-26 VITALS
SYSTOLIC BLOOD PRESSURE: 125 MMHG | WEIGHT: 268 LBS | HEART RATE: 96 BPM | BODY MASS INDEX: 50.6 KG/M2 | OXYGEN SATURATION: 96 % | DIASTOLIC BLOOD PRESSURE: 77 MMHG | TEMPERATURE: 98 F | HEIGHT: 61 IN | RESPIRATION RATE: 18 BRPM

## 2023-06-26 DIAGNOSIS — S69.92XA INJURY OF LEFT THUMB, INITIAL ENCOUNTER: Primary | ICD-10-CM

## 2023-06-26 PROCEDURE — 99283 EMERGENCY DEPT VISIT LOW MDM: CPT

## 2023-06-26 RX ORDER — HYDROCODONE BITARTRATE AND ACETAMINOPHEN 5; 325 MG/1; MG/1
1 TABLET ORAL EVERY 6 HOURS PRN
Qty: 12 TABLET | Refills: 0 | Status: SHIPPED | OUTPATIENT
Start: 2023-06-26

## 2023-06-26 NOTE — ED PROVIDER NOTES
Encounter Date: 6/26/2023  History from patient     History     Chief Complaint   Patient presents with    thumb pain     C/o left thumb pain from smashing in car door yesterday     HPI    43 y.o. female  has a past medical history of Asthma, Cancer, Chronic anemia, Encounter for blood transfusion, Migraine, Migraine headache, Pulmonary embolism, and Vaginal cuff cellulitis. Presenting with  thumb pain (C/o left thumb pain from smashing in car door yesterday)      Review of patient's allergies indicates:   Allergen Reactions    Ciprofloxacin      Other reaction(s): Unknown    Ketorolac Hives     Past Medical History:   Diagnosis Date    Asthma     Cancer     uterine    Chronic anemia     Encounter for blood transfusion     Migraine     Migraine headache     Pulmonary embolism     Vaginal cuff cellulitis      Past Surgical History:   Procedure Laterality Date    CHOLECYSTECTOMY      DILATION AND CURETTAGE OF UTERUS      HYSTERECTOMY  02/20/2015    robotic    [place] REMOVAL OF OVARY/TUBE(S) Bilateral 02/20/2015    robotic     Family History   Problem Relation Age of Onset    Diabetes Mother     Stroke Mother     Hypertension Mother     Mental illness Mother     Hypertension Father     Heart attack Father     Asthma Sister     Lung cancer Maternal Aunt     Diabetes Maternal Aunt     Migraines Maternal Aunt     Breast cancer Neg Hx     Colon cancer Neg Hx     Ovarian cancer Neg Hx      Social History     Tobacco Use    Smoking status: Never    Smokeless tobacco: Never   Substance Use Topics    Alcohol use: No    Drug use: Yes     Types: Methamphetamines     Comment: last used 1 week ago     Review of Systems   Musculoskeletal:         Left thumb pain     Physical Exam     Initial Vitals [06/26/23 0936]   BP Pulse Resp Temp SpO2   130/87 104 18 97.8 °F (36.6 °C) 96 %      MAP       --         Physical Exam    Nursing note and vitals reviewed.  Constitutional: She appears well-developed.   Eyes: EOM are normal.    Cardiovascular:  Normal rate.           Pulmonary/Chest: Breath sounds normal.     Neurological: She is alert and oriented to person, place, and time.   Psychiatric: She has a normal mood and affect.       ED Course   Procedures  Labs Reviewed - No data to display       Imaging Results              X-Ray Finger 2 or More Views Left (Final result)  Result time 06/26/23 10:11:58      Final result by Anton Montgomery MD (06/26/23 10:11:58)                   Impression:      1. No acute osseous defect identified      Electronically signed by: Anton Montgomery  Date:    06/26/2023  Time:    10:11               Narrative:    EXAMINATION:  XR FINGER 2 OR MORE VIEWS LEFT    CLINICAL HISTORY:  ; smashed thumb;.    COMPARISON:  03/21/2017    FINDINGS:  AP, lateral, and oblique views reveal no definite fracture or dislocation.  No aggressive osteolytic or osteoblastic lesion is seen.  Joint spaces appear grossly intact.                                       Medications - No data to display  Medical Decision Making:   Initial Assessment:   Patient is matched her thumb in the car door yesterday, she comes in with complaint of pain in the thumb, she has an artificial nail stuck on the nail, I advised her that I can probably take it off and see but she says it is too painful to take it off so she will soak it at home and let it fall off, I have advised her that her x-ray does not show any fractures, but she might have blood clot under the nail, once the nail comes off it will be easy to see as such she does not have any other swelling or bruising of the thumb, I advised her that once the nail comes off she can always come back or go see her family doctor in case she has a blood clot under the nail we can always drain it out.  I am going to put her on pain medication and let her go home.                        Clinical Impression:   Final diagnoses:  [S69.92XA] Injury of left thumb, initial encounter (Primary)        ED Disposition  Condition    Discharge Stable          ED Prescriptions       Medication Sig Dispense Start Date End Date Auth. Provider    HYDROcodone-acetaminophen (NORCO) 5-325 mg per tablet Take 1 tablet by mouth every 6 (six) hours as needed for Pain. 12 tablet 6/26/2023 -- Radha Morel MD          Follow-up Information       Follow up With Specialties Details Why Contact Info    Renetta De La Paz MD Family Medicine In 2 days  [address]  [place] [address]  [phone]               Radha Morel MD  06/26/23 7849

## 2023-06-26 NOTE — DISCHARGE INSTRUCTIONS
Once the nail comes off, either you can come back to let us see if there is a blood clot under the nail or you can go to see your family doctor and they can make a hole in the nail if needed.    Take medicines as prescribed    See your family doctor in one to 2 days for further evaluation, workup, and treatment as necessary    Avoid driving or operating machinery while taking medicines as some medicines might cause drowsiness and may cause problems. Also pain medicines have potential of being addictive  so use Pain meds specially Narcotics Sparingly.    The exam and treatment you received in Emergency Room was for an urgent problem and NOT INTENDED AS COMPLETE CARE. It is important that you FOLLOW UP with a doctor for ongoing care. If your symptoms become WORSE or you DO NOT IMPROVE and you are unable to reach your health care provider, you should RETURN to the emergency department. The Emergency Room doctor has provided a PRELIMINARY INTERPRETATION of all your STUDIES. A final interpretation may be done after you are discharged. IF A CHANGE in your diagnosis or treatment is needed WE WILL CONTACT YOU. It is critical that we have a CURRENT PHONE NUMBER FOR YOU.

## 2023-06-29 ENCOUNTER — HOSPITAL ENCOUNTER (EMERGENCY)
Facility: HOSPITAL | Age: 43
Discharge: HOME | End: 2023-06-29
Attending: INTERNAL MEDICINE
Payer: MEDICAID

## 2023-06-29 VITALS
TEMPERATURE: 98 F | DIASTOLIC BLOOD PRESSURE: 98 MMHG | OXYGEN SATURATION: 99 % | HEIGHT: 64 IN | WEIGHT: 247.38 LBS | BODY MASS INDEX: 42.23 KG/M2 | SYSTOLIC BLOOD PRESSURE: 156 MMHG | HEART RATE: 110 BPM | RESPIRATION RATE: 18 BRPM

## 2023-06-29 DIAGNOSIS — L08.9 INFECTED SEBACEOUS CYST OF SKIN: ICD-10-CM

## 2023-06-29 DIAGNOSIS — L03.311 CELLULITIS OF ABDOMINAL WALL: Primary | ICD-10-CM

## 2023-06-29 DIAGNOSIS — E66.9 OBESITY, UNSPECIFIED CLASSIFICATION, UNSPECIFIED OBESITY TYPE, UNSPECIFIED WHETHER SERIOUS COMORBIDITY PRESENT: ICD-10-CM

## 2023-06-29 DIAGNOSIS — L72.3 INFECTED SEBACEOUS CYST OF SKIN: ICD-10-CM

## 2023-06-29 PROCEDURE — 99283 EMERGENCY DEPT VISIT LOW MDM: CPT

## 2023-06-29 PROCEDURE — 25000003 PHARM REV CODE 250: Performed by: INTERNAL MEDICINE

## 2023-06-29 RX ORDER — DOXYCYCLINE HYCLATE 100 MG
100 TABLET ORAL
Status: COMPLETED | OUTPATIENT
Start: 2023-06-29 | End: 2023-06-29

## 2023-06-29 RX ORDER — DOXYCYCLINE 100 MG/1
100 CAPSULE ORAL 2 TIMES DAILY
Qty: 20 CAPSULE | Refills: 0 | Status: SHIPPED | OUTPATIENT
Start: 2023-06-29 | End: 2023-07-09

## 2023-06-29 RX ADMIN — DOXYCYCLINE HYCLATE 100 MG: 100 TABLET, COATED ORAL at 05:06

## 2023-06-29 NOTE — ED PROVIDER NOTES
Encounter Date: 6/29/2023       History     Chief Complaint   Patient presents with    Abscess     Pt states that she has an abscess on her abdomen that she has been having for a couple of days. States that this morning it is more irritated and red than it has been.     43-year-old obese white female presents emergency department again but this time she is reporting abscess the midabdomen.  Patient was here 2 days ago for something else and had a Band-Aid covering but never mentioned it to the physician at that time    Review of patient's allergies indicates:   Allergen Reactions    Ciprofloxacin      Other reaction(s): Unknown    Ketorolac Hives     Past Medical History:   Diagnosis Date    Asthma     Cancer     uterine    Chronic anemia     Encounter for blood transfusion     Migraine     Migraine headache     Pulmonary embolism     Vaginal cuff cellulitis      Past Surgical History:   Procedure Laterality Date    CHOLECYSTECTOMY      DILATION AND CURETTAGE OF UTERUS      HYSTERECTOMY  02/20/2015    robotic    [place] REMOVAL OF OVARY/TUBE(S) Bilateral 02/20/2015    robotic     Family History   Problem Relation Age of Onset    Diabetes Mother     Stroke Mother     Hypertension Mother     Mental illness Mother     Hypertension Father     Heart attack Father     Asthma Sister     Lung cancer Maternal Aunt     Diabetes Maternal Aunt     Migraines Maternal Aunt     Breast cancer Neg Hx     Colon cancer Neg Hx     Ovarian cancer Neg Hx      Social History     Tobacco Use    Smoking status: Never    Smokeless tobacco: Never   Substance Use Topics    Alcohol use: No    Drug use: Yes     Types: Methamphetamines     Comment: last used 1 week ago     Review of Systems   Constitutional: Negative.  Negative for activity change, appetite change, chills, diaphoresis, fatigue, fever and unexpected weight change.   HENT: Negative.  Negative for congestion, dental problem, drooling, ear discharge, ear pain, facial swelling, hearing  loss, mouth sores, nosebleeds, postnasal drip, rhinorrhea, sinus pressure, sinus pain, sneezing, sore throat, tinnitus, trouble swallowing and voice change.    Eyes: Negative.  Negative for photophobia, pain, discharge, redness, itching and visual disturbance.   Respiratory: Negative.  Negative for apnea, cough, choking, chest tightness, shortness of breath, wheezing and stridor.    Cardiovascular: Negative.  Negative for chest pain, palpitations and leg swelling.   Gastrointestinal: Negative.  Negative for abdominal distention, abdominal pain, anal bleeding, blood in stool, constipation, diarrhea, nausea, rectal pain and vomiting.   Endocrine: Negative.  Negative for cold intolerance, heat intolerance, polydipsia, polyphagia and polyuria.   Genitourinary: Negative.  Negative for decreased urine volume, difficulty urinating, dyspareunia, dysuria, enuresis, flank pain, frequency, genital sores, hematuria, menstrual problem, pelvic pain, urgency, vaginal bleeding, vaginal discharge and vaginal pain.   Musculoskeletal: Negative.  Negative for arthralgias, back pain, gait problem, joint swelling, myalgias, neck pain and neck stiffness.   Skin:  Positive for rash and wound. Negative for color change and pallor.   Allergic/Immunologic: Negative.  Negative for environmental allergies, food allergies and immunocompromised state.   Neurological: Negative.  Negative for dizziness, tremors, seizures, syncope, facial asymmetry, speech difficulty, weakness, light-headedness, numbness and headaches.   Hematological: Negative.  Negative for adenopathy. Does not bruise/bleed easily.   Psychiatric/Behavioral: Negative.  Negative for agitation, behavioral problems, confusion, decreased concentration, dysphoric mood, hallucinations, self-injury, sleep disturbance and suicidal ideas. The patient is not nervous/anxious and is not hyperactive.    All other systems reviewed and are negative.    Physical Exam     Initial Vitals [06/29/23  0527]   BP Pulse Resp Temp SpO2   (!) 156/98 110 18 97.8 °F (36.6 °C) 99 %      MAP       --         Physical Exam    Nursing note and vitals reviewed.  Constitutional: She appears well-developed and well-nourished. She is not diaphoretic. No distress.   HENT:   Head: Normocephalic and atraumatic.   Mouth/Throat: Oropharynx is clear and moist. No oropharyngeal exudate.   Eyes: Conjunctivae and EOM are normal. Pupils are equal, round, and reactive to light. No scleral icterus.   Neck: Neck supple. No JVD present.   Normal range of motion.  Cardiovascular:  Normal rate, regular rhythm, normal heart sounds and intact distal pulses.     Exam reveals no gallop and no friction rub.       No murmur heard.  Pulmonary/Chest: Breath sounds normal. No respiratory distress. She has no wheezes. She exhibits no tenderness.   Abdominal: Abdomen is soft. Bowel sounds are normal. She exhibits no distension. There is no abdominal tenderness.   Erythema noted mother drawing an Arrow wanting and circled demarcates area of central opening with purulent drainage.  Palpation of the area revealed minimal fluctuance and I was able to express about 3 cc of serosanguineous purulence combination material     There is no rebound.   Musculoskeletal:         General: Normal range of motion.      Cervical back: Normal range of motion and neck supple.     Neurological: She is alert and oriented to person, place, and time. She has normal strength.   Skin: Skin is warm and dry. Capillary refill takes less than 2 seconds.   Psychiatric: She has a normal mood and affect. Her behavior is normal. Judgment and thought content normal.       ED Course   Procedures  Labs Reviewed - No data to display       Imaging Results    None          Medications   doxycycline tablet 100 mg (has no administration in time range)     Medical Decision Making:   Differential Diagnosis:   Abscess, cellulitis, folliculitis, sebaceous cyst                        Clinical  Impression:   Final diagnoses:  [L03.311] Cellulitis of abdominal wall (Primary)  [E66.9] Obesity, unspecified classification, unspecified obesity type, unspecified whether serious comorbidity present  [L72.3, L08.9] Infected sebaceous cyst of skin        ED Disposition Condition    Discharge Stable          ED Prescriptions       Medication Sig Dispense Start Date End Date Auth. Provider    doxycycline (VIBRAMYCIN) 100 MG Cap Take 1 capsule (100 mg total) by mouth 2 (two) times daily. for 10 days 20 capsule 6/29/2023 7/9/2023 Arturo Vidal MD          Follow-up Information       Follow up With Specialties Details Why Contact Info    Renetta De La Paz MD Family Medicine In 1 day  [address]  [place] [address]  [phone]            Marielena Solomon is a certified MA and was present during the entire interaction with this patient      Arturo Vidal MD  06/29/23 7703

## 2023-11-19 ENCOUNTER — HOSPITAL ENCOUNTER (EMERGENCY)
Facility: HOSPITAL | Age: 43
Discharge: HOME | End: 2023-11-19
Attending: INTERNAL MEDICINE
Payer: COMMERCIAL

## 2023-11-19 VITALS
SYSTOLIC BLOOD PRESSURE: 120 MMHG | HEIGHT: 64 IN | RESPIRATION RATE: 18 BRPM | WEIGHT: 250.38 LBS | OXYGEN SATURATION: 98 % | BODY MASS INDEX: 42.75 KG/M2 | DIASTOLIC BLOOD PRESSURE: 84 MMHG | HEART RATE: 113 BPM | TEMPERATURE: 98 F

## 2023-11-19 DIAGNOSIS — L84 CALLUS OF FOOT: Primary | ICD-10-CM

## 2023-11-19 PROCEDURE — 99282 EMERGENCY DEPT VISIT SF MDM: CPT

## 2023-11-20 NOTE — DISCHARGE INSTRUCTIONS
Please see foot doctor for follow-up and further care as soon as you can.    If you do not have a foot doctor.  Please ask your family doctor to refer you to one.    Some available in [place] are and can be called to find if they have an appointment available.    [facility]  [address] [place] [address]  [phone]    Walter Sanders, [facility]  [address] [place] [address]  [phone]    Dr. Gagan Kelly, DPM  [address] [place] [address]  [phone]        Take medicines as prescribed    See your family doctor in one to 2 days for further evaluation, workup, and treatment as necessary    Avoid driving or operating machinery while taking medicines as some medicines might cause drowsiness and may cause problems. Also pain medicines have potential of being addictive  so use Pain meds specially Narcotics Sparingly.    The exam and treatment you received in Emergency Room was for an urgent problem and NOT INTENDED AS COMPLETE CARE. It is important that you FOLLOW UP with a doctor for ongoing care. If your symptoms become WORSE or you DO NOT IMPROVE and you are unable to reach your health care provider, you should RETURN to the emergency department. The Emergency Room doctor has provided a PRELIMINARY INTERPRETATION of all your STUDIES. A final interpretation may be done after you are discharged. IF A CHANGE in your diagnosis or treatment is needed WE WILL CONTACT YOU. It is critical that we have a CURRENT PHONE NUMBER FOR YOU.

## 2023-11-20 NOTE — ED PROVIDER NOTES
Encounter Date: 11/19/2023  History from patient     History     Chief Complaint   Patient presents with    Foot Injury     Pt reports pain on bottom of left foot since Friday. Pt denies injury and increase pain upon ambulation. Pt reports taking 400 mg ibuprofen x2 hrs ago.      HPI      Kymberly Talbot is 43 y.o. female who  has a past medical history of Asthma, Cancer, Chronic anemia, Encounter for blood transfusion, Migraine, Migraine headache, Pulmonary embolism, and Vaginal cuff cellulitis. arrives in ER with c/o Foot Injury (Pt reports pain on bottom of left foot since Friday. Pt denies injury and increase pain upon ambulation. Pt reports taking 400 mg ibuprofen x2 hrs ago. )      Review of patient's allergies indicates:   Allergen Reactions    Ciprofloxacin      Other reaction(s): Unknown    Ketorolac Hives     Past Medical History:   Diagnosis Date    Asthma     Cancer     uterine    Chronic anemia     Encounter for blood transfusion     Migraine     Migraine headache     Pulmonary embolism     Vaginal cuff cellulitis      Past Surgical History:   Procedure Laterality Date    CHOLECYSTECTOMY      DILATION AND CURETTAGE OF UTERUS      HYSTERECTOMY  02/20/2015    robotic    [place] REMOVAL OF OVARY/TUBE(S) Bilateral 02/20/2015    robotic     Family History   Problem Relation Age of Onset    Diabetes Mother     Stroke Mother     Hypertension Mother     Mental illness Mother     Hypertension Father     Heart attack Father     Asthma Sister     Lung cancer Maternal Aunt     Diabetes Maternal Aunt     Migraines Maternal Aunt     Breast cancer Neg Hx     Colon cancer Neg Hx     Ovarian cancer Neg Hx      Social History     Tobacco Use    Smoking status: Never    Smokeless tobacco: Never   Substance Use Topics    Alcohol use: No    Drug use: Yes     Types: Methamphetamines     Comment: last used 1 week ago     Review of Systems   Constitutional:  Negative for fever.   HENT:  Negative for trouble swallowing and voice  change.    Eyes:  Negative for visual disturbance.   Respiratory:  Negative for cough and shortness of breath.    Cardiovascular:  Negative for chest pain.   Gastrointestinal:  Negative for abdominal pain, diarrhea and vomiting.   Genitourinary:  Negative for dysuria and hematuria.   Musculoskeletal:  Negative for gait problem.        Left sole of the foot pain proximal to the toes   Skin:  Negative for color change and rash.   Neurological:  Negative for headaches.   Psychiatric/Behavioral:  Negative for behavioral problems and sleep disturbance.    All other systems reviewed and are negative.      Physical Exam     Initial Vitals [11/19/23 2247]   BP Pulse Resp Temp SpO2   120/84 (!) 113 18 97.8 °F (36.6 °C) 98 %      MAP       --         Physical Exam    Nursing note and vitals reviewed.  Constitutional: She appears well-developed and well-nourished.   Cardiovascular:  Normal rate.           Pulmonary/Chest: Breath sounds normal.   Musculoskeletal:        Feet:       Comments: Multiple calluses on the left sole proximal to toes where she is hurting, there is no signs of any infection in there, there is no erythema, there is no bony tenderness, there is 1 callus on the other foot also,           ED Course   Procedures  Labs Reviewed - No data to display       Imaging Results    None          Medications - No data to display  Medical Decision Making    Kymberly Talbot is 43 y.o. female who  has a past medical history of Asthma, Cancer, Chronic anemia, Encounter for blood transfusion, Migraine, Migraine headache, Pulmonary embolism, and Vaginal cuff cellulitis. arrives in ER with c/o Foot Injury (Pt reports pain on bottom of left foot since Friday. Pt denies injury and increase pain upon ambulation. Pt reports taking 400 mg ibuprofen x2 hrs ago. )      Patient essentially comes to the emergency room with complaint of left sole pain going on for 2 days proximal to the toes, when I examined her I found that patient  has multiple calluses on the left foot, there is no signs of any infection in there, no signs of any acute abnormality going on in there, and I asked her that it does not appear like has been going on only for a couple of days it looks like has been there for some time, and she says yes it is about a month but the calluses she has appeared like has been farming for a few months to years, and I advised her that she can go and see 1 of the podiatrist and they can take care of it from there.  I offered her to do the x-ray on the foot, but she says she is okay with that, I will provide her with the telephone number for the podiatrist and let her go and see the podiatrist for follow-up.    She is taking ibuprofen for pain and apparently is working.                                   Clinical Impression:  Final diagnoses:  [L84] Callus of foot (Primary)          ED Disposition Condition    Discharge Stable          ED Prescriptions    None       Follow-up Information       Follow up With Specialties Details Why Contact Info    Renetta De La Paz MD Family Medicine In 2 days  [address] [place] [address]  [phone]      Podiatrist  Call                Radha Morel MD  11/19/23 2943     skill demonstration/verbal instruction

## 2025-04-27 NOTE — ED AVS SNAPSHOT
[facility] -   [address] [facility] [address]               Kymberly Talbot   2017 12:31 PM   ED    Description:  Female : 1980   Department:  [facility]           Your Care was Coordinated By:     Provider Role From To    BALTAZAR Moralez Physician Assistant 17 2251 --      Reason for Visit     Rib Injury           Diagnoses this Visit        Comments    Chest wall contusion, left, initial encounter    -  Primary     Rib pain on left side           ED Disposition     ED Disposition Condition Comment    Discharge             To Do List           Follow-up Information     Follow up with MAXWELL Lorenzo In 1 week(s).    Specialty:  Family Medicine    Contact information:    [address]  [facility] [address] [phone]         These Medications        Disp Refills Start End    cyclobenzaprine (FLEXERIL) 10 MG tablet 21 tablet 0 2017    Take 1 tablet (10 mg total) by mouth 3 (three) times daily as needed for Muscle spasms. - Oral    Pharmacy: [facility] [address] [place] [address] #: [phone]         [facility]     [facility] -  Assistance  Registered nurses in the [facility] provide clinical advisement, health education, appointment booking, and other advisory services.  Call for this free service at [phone].             Medications           Message regarding Medications     Verify the changes and/or additions to your medication regime listed below are the same as discussed with your clinician today.  If any of these changes or additions are incorrect, please notify your healthcare provider.        START taking these NEW medications        Refills    cyclobenzaprine (FLEXERIL) 10 MG tablet 0    Sig: Take 1 tablet (10 mg total) by mouth 3 (three) times daily as needed for Muscle spasms.     DC'd home with plan to f/u with PMD and dentist or return for worsening sx as discussed. New Rx of oxycodone provided for pain and clindamycin for tooth infection. Patient verbally acknowledged full understanding of dc instructions and new medications. Ambulated out of ED steady gait no distress, vss     "Class: Print    Route: Oral           Verify that the below list of medications is an accurate representation of the medications you are currently taking.  If none reported, the list may be blank. If incorrect, please contact your healthcare provider. Carry this list with you in case of emergency.           Current Medications     azithromycin (Z-SOUTH) 250 MG tablet Take 2 tablets by mouth on day 1; Take 1 tablet by mouth on days 2-5    cyclobenzaprine (FLEXERIL) 10 MG tablet Take 1 tablet (10 mg total) by mouth 3 (three) times daily as needed for Muscle spasms.    enoxaparin (LOVENOX) 150 mg/mL Syrg Inject 1 mg/kg into the skin every 12 (twelve) hours.    naproxen (NAPROSYN) 500 MG tablet Take 1 tablet (500 mg total) by mouth 2 (two) times daily with meals.    warfarin (COUMADIN) 5 MG tablet Take 5 mg by mouth once daily.           Clinical Reference Information           Your Vitals Were     BP Pulse Temp Resp Height Weight    133/81 (BP Location: Right arm, Patient Position: Sitting) 99 97.9 °F (36.6 °C) (Oral) 20 5' 2" (1.575 m) 117.9 kg (260 lb)    Last Period SpO2 BMI          02/03/2015 97% 47.55 kg/m2        Allergies as of 1/6/2017     No Known Allergies      Immunizations Administered on Date of Encounter - 1/6/2017     None      ED Micro, Lab, POCT     None      ED Imaging Orders     Start Ordered       Status Ordering Provider    01/06/17 1235 01/06/17 1235  X-Ray Ribs 2 View Left  1 time imaging      Final result         Discharge Instructions           Chest Contusion    A contusion is a bruise to the skin, muscle, or ribs. It may cause pain, tenderness, and swelling. It may turn the skin purple until it heals. Contusions take a few days to a few weeks to heal.  Home care  Follow these guidelines when caring for yourself at home:  · Rest. Dont do any heavy lifting or strenuous activity. Dont do any activity that causes pain.  · Put an ice pack on the injured area. Do this for 20 minutes every 1 to 2 " hours the first day. You can make an ice pack by wrapping a plastic bag of ice cubes in a thin towel. Continue to use the ice pack 3 to 4 times a day for the next 2 days. Then use the ice pack as needed to ease pain and swelling.  · After 1 to 2 days you may put a warm compress on the area. Do this for 10 minutes several times a day. A warm compress is a clean cloth thats damp with warm water.  · Hold a pillow to the affected area when you cough. This will help ease pain.  · You may use acetaminophen or ibuprofen to control pain, unless another pain medicine was prescribed. If you have chronic liver or kidney disease, talk with your health care provider before using these medicines. Also talk with your provider if youve had a stomach ulcer or GI bleeding.  Follow-up care  Follow up with your health care provider during the next week, or as advised.  When to seek medical advice  Call your health care provider right away if any of these occur:  · Shortness of breath, difficulty breathing, or breathing fast  · Chest pain gets worse when you breathe  · Severe pain that comes on suddenly or lasts more than an hour  · Dizziness, weakness, or fainting  · New abdominal pain or abdominal pain that gets worse  ·  Fever of 101ºF (38.3ºC) or higher, or as directed by your health care provider  © 8819-7993 [employer]. [address] [place] [address], [place] [address]. All rights reserved. This information is not intended as a substitute for professional medical care. Always follow your healthcare professional's instructions.           [facility] complies with applicable Federal civil rights laws and does not discriminate on the basis of race, color, national origin, age, disability, or sex.        Language Assistance Services     ATTENTION: Language assistance services are available, free of charge. Please call [phone].      ATENCIÓN: Si romeo lockhart, tiene a andrews disposición servicios gratuitos  de asistencia lingüística. Isra tejeda [phone].     RAUL Ý: N?u b?n nói Ti?ng Vi?t, có các d?ch v? h? tr? ngôn ng? mi?n phí momoh cho b?n. G?i s? [phone].